# Patient Record
Sex: MALE | Race: WHITE | NOT HISPANIC OR LATINO | URBAN - METROPOLITAN AREA
[De-identification: names, ages, dates, MRNs, and addresses within clinical notes are randomized per-mention and may not be internally consistent; named-entity substitution may affect disease eponyms.]

---

## 2018-06-25 ENCOUNTER — EMERGENCY (EMERGENCY)
Facility: HOSPITAL | Age: 43
LOS: 1 days | End: 2018-06-25
Payer: COMMERCIAL

## 2018-06-25 PROCEDURE — 70450 CT HEAD/BRAIN W/O DYE: CPT | Mod: 26

## 2018-06-25 PROCEDURE — 72125 CT NECK SPINE W/O DYE: CPT | Mod: 26

## 2018-06-25 PROCEDURE — 99284 EMERGENCY DEPT VISIT MOD MDM: CPT

## 2019-10-01 ENCOUNTER — APPOINTMENT (OUTPATIENT)
Dept: FAMILY MEDICINE | Facility: CLINIC | Age: 44
End: 2019-10-01
Payer: COMMERCIAL

## 2019-10-01 VITALS
HEART RATE: 72 BPM | OXYGEN SATURATION: 98 % | DIASTOLIC BLOOD PRESSURE: 80 MMHG | WEIGHT: 195 LBS | BODY MASS INDEX: 25.03 KG/M2 | RESPIRATION RATE: 16 BRPM | SYSTOLIC BLOOD PRESSURE: 130 MMHG | TEMPERATURE: 97.8 F | HEIGHT: 74 IN

## 2019-10-01 DIAGNOSIS — Z82.49 FAMILY HISTORY OF ISCHEMIC HEART DISEASE AND OTHER DISEASES OF THE CIRCULATORY SYSTEM: ICD-10-CM

## 2019-10-01 DIAGNOSIS — Z78.9 OTHER SPECIFIED HEALTH STATUS: ICD-10-CM

## 2019-10-01 LAB
BILIRUB UR QL STRIP: NORMAL
GLUCOSE UR-MCNC: NORMAL
HCG UR QL: 1 EU/DL
HGB UR QL STRIP.AUTO: NORMAL
KETONES UR-MCNC: NORMAL
LEUKOCYTE ESTERASE UR QL STRIP: NORMAL
NITRITE UR QL STRIP: NORMAL
PH UR STRIP: 7.5
PROT UR STRIP-MCNC: NORMAL
SP GR UR STRIP: 1.01

## 2019-10-01 PROCEDURE — 90471 IMMUNIZATION ADMIN: CPT

## 2019-10-01 PROCEDURE — 36415 COLL VENOUS BLD VENIPUNCTURE: CPT

## 2019-10-01 PROCEDURE — 99396 PREV VISIT EST AGE 40-64: CPT | Mod: 25

## 2019-10-01 PROCEDURE — 81003 URINALYSIS AUTO W/O SCOPE: CPT | Mod: QW

## 2019-10-01 PROCEDURE — 90686 IIV4 VACC NO PRSV 0.5 ML IM: CPT

## 2019-10-01 NOTE — PHYSICAL EXAM
[No Acute Distress] : no acute distress [Well-Appearing] : well-appearing [Normal Sclera/Conjunctiva] : normal sclera/conjunctiva [PERRL] : pupils equal round and reactive to light [Normal Oropharynx] : the oropharynx was normal [Normal TMs] : both tympanic membranes were normal [No Lymphadenopathy] : no lymphadenopathy [No Respiratory Distress] : no respiratory distress  [Thyroid Normal, No Nodules] : the thyroid was normal and there were no nodules present [Clear to Auscultation] : lungs were clear to auscultation bilaterally [Normal S1, S2] : normal S1 and S2 [Regular Rhythm] : with a regular rhythm [No Edema] : there was no peripheral edema [Soft] : abdomen soft [No Extremity Clubbing/Cyanosis] : no extremity clubbing/cyanosis [Non Tender] : non-tender [Normal Posterior Cervical Nodes] : no posterior cervical lymphadenopathy [No HSM] : no HSM [Normal Anterior Cervical Nodes] : no anterior cervical lymphadenopathy [No Spinal Tenderness] : no spinal tenderness [Kyphosis] : no kyphosis [No Joint Swelling] : no joint swelling [No Focal Deficits] : no focal deficits [No Rash] : no rash [Normal Gait] : normal gait [Deep Tendon Reflexes (DTR)] : deep tendon reflexes were 2+ and symmetric [Alert and Oriented x3] : oriented to person, place, and time [Normal Insight/Judgement] : insight and judgment were intact [de-identified] : warm and dry  [de-identified] : calm and engaging  [de-identified] : no tremors   PAIN to palpation medial epicondyle RIGHT arm

## 2019-10-01 NOTE — ASSESSMENT
[FreeTextEntry1] :  Well exam for 43 year old WM  with PMH as stated in HPI / active list. \par \par Management : \par \par Tendinitis  Advised ice 15 minutes on, and how often before and after use.  Also, advised a trial of ibuprofen, 600 mg 3 times daily with food or OTC Pepcid  for up to 7 days.\par Offered consult for steroid injection if fails\par \par See HPI and Plan\par \par Labs in office today.   Will advise. \par \par Best wishes offered !\par

## 2019-10-01 NOTE — HISTORY OF PRESENT ILLNESS
[FreeTextEntry1] : Annual CPE \par [facility]  [de-identified] : Mr. SKYLAR STEWART presents today for CPE; known to me from [facility].\par He is an affable 43 year old male with PMH significant for   NONE \par PSH significant for Shoulder and knee arthroscopy.\par Denies any recent ER visits/hospitalizations/ MVA's or MSK injuries. \par \par ONLY complaint today is RIGHT ELBOW TENDINITIS for a few months.\par Is initiating PT today. \par Never evaluated .  NO NSAID.  "Just deal with it". \par Has tried CBD oil for various aches and pain and  "I think I at least sleep better". 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

## 2019-10-07 LAB
ALBUMIN SERPL ELPH-MCNC: 5.1 G/DL
ALP BLD-CCNC: 72 U/L
ALT SERPL-CCNC: 42 U/L
ANION GAP SERPL CALC-SCNC: 14 MMOL/L
AST SERPL-CCNC: 28 U/L
BASOPHILS # BLD AUTO: 0.07 K/UL
BASOPHILS NFR BLD AUTO: 1.1 %
BILIRUB SERPL-MCNC: 0.9 MG/DL
BUN SERPL-MCNC: 13 MG/DL
CALCIUM SERPL-MCNC: 10.4 MG/DL
CHLORIDE SERPL-SCNC: 101 MMOL/L
CHOLEST SERPL-MCNC: 185 MG/DL
CHOLEST/HDLC SERPL: 2 RATIO
CO2 SERPL-SCNC: 27 MMOL/L
CREAT SERPL-MCNC: 1.15 MG/DL
EOSINOPHIL # BLD AUTO: 0.1 K/UL
EOSINOPHIL NFR BLD AUTO: 1.6 %
GLUCOSE SERPL-MCNC: 97 MG/DL
HCT VFR BLD CALC: 51.4 %
HDLC SERPL-MCNC: 93 MG/DL
HGB BLD-MCNC: 17.3 G/DL
IMM GRANULOCYTES NFR BLD AUTO: 0.3 %
LDLC SERPL CALC-MCNC: 81 MG/DL
LYMPHOCYTES # BLD AUTO: 1.79 K/UL
LYMPHOCYTES NFR BLD AUTO: 28.5 %
MAN DIFF?: NORMAL
MCHC RBC-ENTMCNC: 30.6 PG
MCHC RBC-ENTMCNC: 33.7 GM/DL
MCV RBC AUTO: 91 FL
MONOCYTES # BLD AUTO: 0.58 K/UL
MONOCYTES NFR BLD AUTO: 9.2 %
NEUTROPHILS # BLD AUTO: 3.72 K/UL
NEUTROPHILS NFR BLD AUTO: 59.3 %
PLATELET # BLD AUTO: 310 K/UL
POTASSIUM SERPL-SCNC: 5.1 MMOL/L
PROT SERPL-MCNC: 7.8 G/DL
RBC # BLD: 5.65 M/UL
RBC # FLD: 11.7 %
SODIUM SERPL-SCNC: 142 MMOL/L
TRIGL SERPL-MCNC: 56 MG/DL
TSH SERPL-ACNC: 1.16 UIU/ML
WBC # FLD AUTO: 6.28 K/UL

## 2020-10-05 ENCOUNTER — LABORATORY RESULT (OUTPATIENT)
Age: 45
End: 2020-10-05

## 2020-10-06 ENCOUNTER — APPOINTMENT (OUTPATIENT)
Dept: FAMILY MEDICINE | Facility: CLINIC | Age: 45
End: 2020-10-06
Payer: COMMERCIAL

## 2020-10-06 VITALS
HEIGHT: 74 IN | SYSTOLIC BLOOD PRESSURE: 126 MMHG | RESPIRATION RATE: 16 BRPM | OXYGEN SATURATION: 98 % | TEMPERATURE: 98.4 F | DIASTOLIC BLOOD PRESSURE: 80 MMHG | BODY MASS INDEX: 24.9 KG/M2 | HEART RATE: 74 BPM | WEIGHT: 194 LBS

## 2020-10-06 DIAGNOSIS — M77.8 OTHER ENTHESOPATHIES, NOT ELSEWHERE CLASSIFIED: ICD-10-CM

## 2020-10-06 PROCEDURE — 81003 URINALYSIS AUTO W/O SCOPE: CPT | Mod: QW

## 2020-10-06 PROCEDURE — 99396 PREV VISIT EST AGE 40-64: CPT | Mod: 25

## 2020-10-09 PROBLEM — M77.8 TENDINITIS OF ELBOW: Status: RESOLVED | Noted: 2019-10-01 | Resolved: 2020-10-09

## 2020-10-09 NOTE — PHYSICAL EXAM
[No Acute Distress] : no acute distress [Well-Appearing] : well-appearing [Normal Sclera/Conjunctiva] : normal sclera/conjunctiva [PERRL] : pupils equal round and reactive to light [Normal Oropharynx] : the oropharynx was normal [Normal TMs] : both tympanic membranes were normal [No Lymphadenopathy] : no lymphadenopathy [Thyroid Normal, No Nodules] : the thyroid was normal and there were no nodules present [No Respiratory Distress] : no respiratory distress  [Clear to Auscultation] : lungs were clear to auscultation bilaterally [Regular Rhythm] : with a regular rhythm [Normal S1, S2] : normal S1 and S2 [No Edema] : there was no peripheral edema [No Extremity Clubbing/Cyanosis] : no extremity clubbing/cyanosis [Soft] : abdomen soft [Non Tender] : non-tender [No HSM] : no HSM [Normal Posterior Cervical Nodes] : no posterior cervical lymphadenopathy [Normal Anterior Cervical Nodes] : no anterior cervical lymphadenopathy [No Spinal Tenderness] : no spinal tenderness [No Joint Swelling] : no joint swelling [No Rash] : no rash [No Focal Deficits] : no focal deficits [Normal Gait] : normal gait [Deep Tendon Reflexes (DTR)] : deep tendon reflexes were 2+ and symmetric [Alert and Oriented x3] : oriented to person, place, and time [Normal Insight/Judgement] : insight and judgment were intact [Kyphosis] : no kyphosis [de-identified] : calm and engaging  [de-identified] : no tremors   PAIN to palpation medial epicondyle RIGHT arm  [de-identified] : warm and dry

## 2020-10-09 NOTE — ASSESSMENT
[FreeTextEntry1] : \par \par  Well exam for 44 year old WM with PMH as stated in HPI / active list. \par \par Management : \par \par \par See HPI and Plan\par \par Labs in office today.   Will advise. \par \par Best wishes offered !\par \par

## 2020-10-09 NOTE — HEALTH RISK ASSESSMENT
[Very Good] : ~his/her~  mood as very good [Yes] : Yes [Monthly or less (1 pt)] : Monthly or less (1 point) [1 or 2 (0 pts)] : 1 or 2 (0 points) [Never (0 pts)] : Never (0 points) [No] : In the past 12 months have you used drugs other than those required for medical reasons? No [0] : 2) Feeling down, depressed, or hopeless: Not at all (0) [FreeTextEntry1] : CPE [] : No [Audit-CScore] : 1 [FYN5Ldyps] : 0

## 2020-10-09 NOTE — HISTORY OF PRESENT ILLNESS
[FreeTextEntry1] : Annual CPE \par Last seen to establish care in October 2019 and encounter reviewed. \par Elbow tendinitis did resolve. \par In last year he has been well and \par Denies any recent ER visits/hospitalizations/ MVA's or MSK injuries.\par Continues regular exercise and healthy eating .  Weight is stable . 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  [de-identified] : In review: 10/19\par Mr. SKYLAR STEWART presents today for CPE; known to me from [facility].\par He is an affable 43 year old male with PMH significant for   NONE \par PSH significant for Shoulder and knee arthroscopy.\par Denies any recent ER visits/hospitalizations/ MVA's or MSK injuries. \par \par ONLY complaint today is RIGHT ELBOW TENDINITIS for a few months.\par Is initiating PT today. \par Never evaluated .  NO NSAID.  "Just deal with it". \par Has tried CBD oil for various aches and pain and  "I think I at least sleep better". 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

## 2020-10-10 LAB
BILIRUB UR QL STRIP: NEGATIVE
CLARITY UR: CLEAR
COLLECTION METHOD: NORMAL
GLUCOSE UR-MCNC: NEGATIVE
HCG UR QL: 0.2 EU/DL
HGB UR QL STRIP.AUTO: NEGATIVE
KETONES UR-MCNC: NEGATIVE
LEUKOCYTE ESTERASE UR QL STRIP: NORMAL
NITRITE UR QL STRIP: NEGATIVE
PH UR STRIP: 6
PROT UR STRIP-MCNC: NEGATIVE
SP GR UR STRIP: 1.02

## 2020-10-20 ENCOUNTER — APPOINTMENT (OUTPATIENT)
Dept: FAMILY MEDICINE | Facility: CLINIC | Age: 45
End: 2020-10-20
Payer: COMMERCIAL

## 2020-10-20 DIAGNOSIS — Z23 ENCOUNTER FOR IMMUNIZATION: ICD-10-CM

## 2020-10-20 PROCEDURE — 99072 ADDL SUPL MATRL&STAF TM PHE: CPT

## 2020-10-20 PROCEDURE — 99214 OFFICE O/P EST MOD 30 MIN: CPT | Mod: 25

## 2020-10-20 RX ORDER — IBUPROFEN 600 MG/1
600 TABLET, FILM COATED ORAL 3 TIMES DAILY
Qty: 1 | Refills: 1 | Status: DISCONTINUED | COMMUNITY
Start: 2019-10-01 | End: 2020-10-20

## 2020-10-21 PROBLEM — Z23 INFLUENZA VACCINATION GIVEN: Status: RESOLVED | Noted: 2019-10-01 | Resolved: 2020-10-21

## 2020-10-21 NOTE — REVIEW OF SYSTEMS
[Fatigue] : fatigue [Recent Change In Weight] : ~T recent weight change [Negative] : Integumentary [Headache] : no headache [Confusion] : no confusion [Memory Loss] : no memory loss [FreeTextEntry2] : a few lbs

## 2020-10-21 NOTE — PHYSICAL EXAM
[No Acute Distress] : no acute distress [Normal Sclera/Conjunctiva] : normal sclera/conjunctiva [Normal Oropharynx] : the oropharynx was normal [Supple] : supple [No Respiratory Distress] : no respiratory distress  [No Accessory Muscle Use] : no accessory muscle use [Regular Rhythm] : with a regular rhythm [Normal S1, S2] : normal S1 and S2 [Normal Gait] : normal gait [Alert and Oriented x3] : oriented to person, place, and time [de-identified] : a bit heightened   articulate and good eye contact  engaging

## 2020-10-21 NOTE — ASSESSMENT
[FreeTextEntry1] : Much support rendered and agree with the plan.\par \par FOr anxiety related insomnia will offer a trail of  Remeron.

## 2020-10-21 NOTE — HISTORY OF PRESENT ILLNESS
[FreeTextEntry1] : FU on ACUTE ANXIETY . [de-identified] : Shanna HARDY  presents tonight to discuss his acute anxiety, which seems to have worsened in recent week.\par at our encounter 2 weeks ago.  He did refer to some transient anxiety after watching a season on TV  "[place]".\par since however, he has developed more persistent symptoms.  Yesterday he took a day off from work and researched his symptoms and learned ( INTERNET SEARCH; MULTIPLE sites) he has somatic symptoms of acute anxiety. He describes chest tightness, sweaty feet, a sense of dyspnea but most significantly is interrupted sleep; "less than 3 hours" .  "ALSO no appetite". He is startled at this onset ;\par Fortunately he was able to discuss via [employer] , with mental health  ( available through his employer, [employer])  and is now anticipating regular visits in an attempt to "get this understood and resolved".

## 2020-11-06 ENCOUNTER — APPOINTMENT (OUTPATIENT)
Dept: UROLOGY | Facility: CLINIC | Age: 45
End: 2020-11-06
Payer: COMMERCIAL

## 2020-11-06 VITALS
SYSTOLIC BLOOD PRESSURE: 127 MMHG | WEIGHT: 194 LBS | TEMPERATURE: 97.9 F | HEIGHT: 74 IN | HEART RATE: 88 BPM | DIASTOLIC BLOOD PRESSURE: 89 MMHG | BODY MASS INDEX: 24.9 KG/M2

## 2020-11-06 PROCEDURE — 99204 OFFICE O/P NEW MOD 45 MIN: CPT

## 2020-11-06 PROCEDURE — 99072 ADDL SUPL MATRL&STAF TM PHE: CPT

## 2020-11-06 NOTE — HISTORY OF PRESENT ILLNESS
[FreeTextEntry1] : CC: \par \par HPI: \par \par Duration: \par Location: \par Severity:\par Stability:\par Alleviating factors:\par Exacerbating factors: \par Associated symptoms: \par Quality:\par \par FAMHX: \par SURGHX: \par SOCIAL:\par ROS:\par

## 2020-11-06 NOTE — PHYSICAL EXAM
[General Appearance - Well Developed] : well developed [General Appearance - Well Nourished] : well nourished [Normal Appearance] : normal appearance [Well Groomed] : well groomed [General Appearance - In No Acute Distress] : no acute distress [Edema] : no peripheral edema [Respiration, Rhythm And Depth] : normal respiratory rhythm and effort [Exaggerated Use Of Accessory Muscles For Inspiration] : no accessory muscle use [Abdomen Soft] : soft [Abdomen Tenderness] : non-tender [Costovertebral Angle Tenderness] : no ~M costovertebral angle tenderness [Penis Abnormality] : normal circumcised penis [Urinary Bladder Findings] : the bladder was normal on palpation [Scrotum] : the scrotum was normal [Rectal Exam - Seminal Vesicles] : the seminal vesicles were normal [Epididymis] : the epididymides were normal [Testes Tenderness] : no tenderness of the testes [FreeTextEntry1] : bilat vas deferens palpated [Normal Station and Gait] : the gait and station were normal for the patient's age [] : no rash [No Focal Deficits] : no focal deficits [Oriented To Time, Place, And Person] : oriented to person, place, and time [Affect] : the affect was normal [Mood] : the mood was normal [Not Anxious] : not anxious

## 2020-11-06 NOTE — ASSESSMENT
[FreeTextEntry1] : Plan:\par schedule vasectomy\par After discussions of the various options of birth control available to the patient, he has elected to undergo vasectomy.\par \par I have discussed the risks, benefits, and alternatives with him including, but not limited to, bleeding, infection, recanalization and subsequent pregnancy, testicular pain, hematoma. We discussed the use of local anesthesia for the procedure as well as the need for post vasectomy semen analyses. He also understand that vasectomy will not protect him against sexually transmitted diseases. He agrees to proceed.\par \par

## 2020-11-06 NOTE — HISTORY OF PRESENT ILLNESS
[FreeTextEntry1] : Mr. SKYLAR STEWART 44 year old  M  PMH anxiety and PSH left knee, left bicep, left shoulder, Uvula extraction. Pt comes in to discuss vasectomy.  has 3 kids and does not want anymore kids. \par

## 2020-11-17 ENCOUNTER — APPOINTMENT (OUTPATIENT)
Dept: UROLOGY | Facility: CLINIC | Age: 45
End: 2020-11-17
Payer: COMMERCIAL

## 2020-11-17 VITALS
SYSTOLIC BLOOD PRESSURE: 128 MMHG | HEIGHT: 74 IN | HEART RATE: 102 BPM | DIASTOLIC BLOOD PRESSURE: 82 MMHG | TEMPERATURE: 97 F

## 2020-11-17 PROCEDURE — 55250 REMOVAL OF SPERM DUCT(S): CPT

## 2020-11-24 ENCOUNTER — APPOINTMENT (OUTPATIENT)
Dept: UROLOGY | Facility: CLINIC | Age: 45
End: 2020-11-24
Payer: COMMERCIAL

## 2020-11-24 VITALS
HEART RATE: 65 BPM | BODY MASS INDEX: 24.9 KG/M2 | TEMPERATURE: 97.5 F | WEIGHT: 194 LBS | SYSTOLIC BLOOD PRESSURE: 145 MMHG | HEIGHT: 74 IN | DIASTOLIC BLOOD PRESSURE: 82 MMHG

## 2020-11-24 LAB — CORE LAB BIOPSY: NORMAL

## 2020-11-24 PROCEDURE — 99024 POSTOP FOLLOW-UP VISIT: CPT

## 2020-11-24 NOTE — PHYSICAL EXAM
[General Appearance - Well Developed] : well developed [General Appearance - Well Nourished] : well nourished [Normal Appearance] : normal appearance [Well Groomed] : well groomed [General Appearance - In No Acute Distress] : no acute distress [Abdomen Soft] : soft [Abdomen Tenderness] : non-tender [Costovertebral Angle Tenderness] : no ~M costovertebral angle tenderness [Urethral Meatus] : meatus normal [Penis Abnormality] : normal circumcised penis [Urinary Bladder Findings] : the bladder was normal on palpation [FreeTextEntry1] : left incision site small amount of pus and a small approx 1 cm area of his incision not totally healed, right site healing well. [Edema] : no peripheral edema [] : no respiratory distress [Respiration, Rhythm And Depth] : normal respiratory rhythm and effort [Exaggerated Use Of Accessory Muscles For Inspiration] : no accessory muscle use [Oriented To Time, Place, And Person] : oriented to person, place, and time [Affect] : the affect was normal [Mood] : the mood was normal [Not Anxious] : not anxious [Normal Station and Gait] : the gait and station were normal for the patient's age [No Focal Deficits] : no focal deficits

## 2020-11-24 NOTE — HISTORY OF PRESENT ILLNESS
[FreeTextEntry1] : Pt comes in follow vasectomy. Pt states the right is healing better then the left. On the left he states he notices a small amount of occasional pus and a small approx 1 cm area of his incision not totally healed

## 2020-12-01 ENCOUNTER — APPOINTMENT (OUTPATIENT)
Dept: UROLOGY | Facility: CLINIC | Age: 45
End: 2020-12-01
Payer: COMMERCIAL

## 2020-12-01 VITALS
DIASTOLIC BLOOD PRESSURE: 82 MMHG | HEART RATE: 80 BPM | HEIGHT: 74 IN | TEMPERATURE: 97.4 F | BODY MASS INDEX: 24.9 KG/M2 | SYSTOLIC BLOOD PRESSURE: 147 MMHG | WEIGHT: 194 LBS

## 2020-12-01 PROCEDURE — 99024 POSTOP FOLLOW-UP VISIT: CPT

## 2020-12-01 NOTE — HISTORY OF PRESENT ILLNESS
[FreeTextEntry1] : Incision site healing well.  incision is closing well.Improved since last visit. Not totally closed yet. No signs of infection.

## 2020-12-17 ENCOUNTER — RX RENEWAL (OUTPATIENT)
Age: 45
End: 2020-12-17

## 2021-01-05 ENCOUNTER — APPOINTMENT (OUTPATIENT)
Dept: UROLOGY | Facility: CLINIC | Age: 46
End: 2021-01-05
Payer: COMMERCIAL

## 2021-01-05 VITALS
BODY MASS INDEX: 24.9 KG/M2 | TEMPERATURE: 97.3 F | SYSTOLIC BLOOD PRESSURE: 131 MMHG | DIASTOLIC BLOOD PRESSURE: 75 MMHG | HEIGHT: 74 IN | HEART RATE: 73 BPM | WEIGHT: 194 LBS

## 2021-01-05 PROCEDURE — 99024 POSTOP FOLLOW-UP VISIT: CPT

## 2021-01-05 NOTE — HISTORY OF PRESENT ILLNESS
[FreeTextEntry1] : Pt comes in follow up vasectomy. Incision site healing well. Pt offers no complaints.

## 2021-01-05 NOTE — PHYSICAL EXAM
[General Appearance - Well Developed] : well developed [General Appearance - Well Nourished] : well nourished [Normal Appearance] : normal appearance [Well Groomed] : well groomed [General Appearance - In No Acute Distress] : no acute distress [Abdomen Soft] : soft [Abdomen Tenderness] : non-tender [Costovertebral Angle Tenderness] : no ~M costovertebral angle tenderness [Urinary Bladder Findings] : the bladder was normal on palpation [FreeTextEntry1] : Vasectomy site healed.  [Edema] : no peripheral edema [] : no respiratory distress [Respiration, Rhythm And Depth] : normal respiratory rhythm and effort [Exaggerated Use Of Accessory Muscles For Inspiration] : no accessory muscle use [Oriented To Time, Place, And Person] : oriented to person, place, and time [Affect] : the affect was normal [Mood] : the mood was normal [Not Anxious] : not anxious [Normal Station and Gait] : the gait and station were normal for the patient's age [No Focal Deficits] : no focal deficits

## 2021-04-20 ENCOUNTER — APPOINTMENT (OUTPATIENT)
Dept: FAMILY MEDICINE | Facility: CLINIC | Age: 46
End: 2021-04-20
Payer: COMMERCIAL

## 2021-04-20 VITALS
HEART RATE: 92 BPM | HEIGHT: 74 IN | WEIGHT: 200 LBS | DIASTOLIC BLOOD PRESSURE: 92 MMHG | RESPIRATION RATE: 16 BRPM | TEMPERATURE: 97.5 F | OXYGEN SATURATION: 98 % | SYSTOLIC BLOOD PRESSURE: 140 MMHG | BODY MASS INDEX: 25.67 KG/M2

## 2021-04-20 DIAGNOSIS — Z98.52 VASECTOMY STATUS: ICD-10-CM

## 2021-04-20 DIAGNOSIS — Z30.09 ENCOUNTER FOR OTHER GENERAL COUNSELING AND ADVICE ON CONTRACEPTION: ICD-10-CM

## 2021-04-20 PROCEDURE — 99214 OFFICE O/P EST MOD 30 MIN: CPT

## 2021-04-20 PROCEDURE — 99072 ADDL SUPL MATRL&STAF TM PHE: CPT

## 2021-04-20 RX ORDER — CEPHALEXIN 500 MG/1
500 CAPSULE ORAL TWICE DAILY
Qty: 14 | Refills: 0 | Status: DISCONTINUED | COMMUNITY
Start: 2020-11-24 | End: 2021-04-20

## 2021-04-21 ENCOUNTER — TRANSCRIPTION ENCOUNTER (OUTPATIENT)
Age: 46
End: 2021-04-21

## 2021-04-25 PROBLEM — Z30.09 FAMILY PLANNING COUNSELING: Status: RESOLVED | Noted: 2020-11-06 | Resolved: 2021-04-25

## 2021-04-25 PROBLEM — Z98.52 HISTORY OF VASECTOMY: Status: RESOLVED | Noted: 2020-11-17 | Resolved: 2021-04-25

## 2021-04-25 NOTE — PHYSICAL EXAM
[Normal Sclera/Conjunctiva] : normal sclera/conjunctiva [Normal Oropharynx] : the oropharynx was normal [Supple] : supple [No Respiratory Distress] : no respiratory distress  [No Accessory Muscle Use] : no accessory muscle use [Regular Rhythm] : with a regular rhythm [Normal S1, S2] : normal S1 and S2 [Normal Gait] : normal gait [Alert and Oriented x3] : oriented to person, place, and time [de-identified] : calm and engaging

## 2021-04-25 NOTE — HISTORY OF PRESENT ILLNESS
[FreeTextEntry1] : would like to discuss going back on a medication for his anxiety and possibly get something to help to sleep \[facility] \par [facility] [de-identified] : Rick presents today to discuss his anxiety:\par he offers that he continues to do much of the hard work with regard to understanding  His anxiety late onset in his life,and therapies to ameliorate it.  He has had some success with therapy, meditation , vigorous regular exercise with some improvement  "for sure".\par Most troubling, is impaired sleep/interrupted sleep.  He also opines that he is  "languishing" a term he identifies with when researching this topic. Rick offers that it means feelings related to depression and anxiety   Further endorses  Further endorses it's a feeling of flatness, lack of Jot in life.\par He further states that he has everything to be happy about, good marriage good job good kids and Good health.\par Has discovered he may have been suppressing some unpleasant events related to growing up  (family)  and relationships in the past.\par \par Rick "just want to be "best version of myself" ;  not sleeping may have sometime impact overall as well.\par His research suggests  he introduce medication but  "Not forever "  but a trial.\par \par In review: Oct 20 2020\par Shanna HARDY  presents tonight to discuss his acute anxiety, which seems to have worsened in recent week.\par at our encounter 2 weeks ago.  He did refer to some transient anxiety after watching a season on TV  "[employer]".\par since however, he has developed more persistent symptoms.  Yesterday he took a day off from work and researched his symptoms and learned ( INTERNET SEARCH; MULTIPLE sites) he has somatic symptoms of acute anxiety. He describes chest tightness, sweaty feet, a sense of dyspnea but most significantly is interrupted sleep; "less than 3 hours" .  "ALSO no appetite". He is startled at this onset ;\par Fortunately he was able to discuss via [facility] , with mental health  ( available through his employer, [employer])  and is now anticipating regular visits in an attempt to "get this understood and resolved".

## 2021-04-25 NOTE — ASSESSMENT
[FreeTextEntry1] : Insomnia and anxiety perhaps component of depression.\par \par MUCH time was afforded in this encounter discussing some of the biochemistry of depression and sleep disorders.\par \par He agrees to give mirtazapine and a trial at night for at least one solid week.\par He also would give low dose Prozac a trial.\par \par we agree he will call me in a week or so to discuss.\par \par advised to continue his regular exercise, nutrition and mediation. \par \par Best wishes! \par \par Time spent: 40 minutes.

## 2021-05-13 ENCOUNTER — RX RENEWAL (OUTPATIENT)
Age: 46
End: 2021-05-13

## 2021-12-13 ENCOUNTER — TRANSCRIPTION ENCOUNTER (OUTPATIENT)
Age: 46
End: 2021-12-13

## 2022-04-05 ENCOUNTER — APPOINTMENT (OUTPATIENT)
Dept: FAMILY MEDICINE | Facility: CLINIC | Age: 47
End: 2022-04-05
Payer: COMMERCIAL

## 2022-04-05 VITALS
OXYGEN SATURATION: 97 % | RESPIRATION RATE: 16 BRPM | BODY MASS INDEX: 25.54 KG/M2 | SYSTOLIC BLOOD PRESSURE: 136 MMHG | HEART RATE: 79 BPM | WEIGHT: 199 LBS | HEIGHT: 74 IN | TEMPERATURE: 98 F | DIASTOLIC BLOOD PRESSURE: 84 MMHG

## 2022-04-05 DIAGNOSIS — F41.9 ANXIETY DISORDER, UNSPECIFIED: ICD-10-CM

## 2022-04-05 PROCEDURE — 36415 COLL VENOUS BLD VENIPUNCTURE: CPT

## 2022-04-05 PROCEDURE — 99396 PREV VISIT EST AGE 40-64: CPT | Mod: 25

## 2022-04-05 PROCEDURE — G0442 ANNUAL ALCOHOL SCREEN 15 MIN: CPT | Mod: NC

## 2022-04-05 RX ORDER — FLUOXETINE HYDROCHLORIDE 20 MG/1
20 CAPSULE ORAL
Qty: 30 | Refills: 0 | Status: DISCONTINUED | COMMUNITY
Start: 2021-04-20 | End: 2022-04-05

## 2022-04-12 PROBLEM — F41.9 ACUTE ANXIETY: Status: RESOLVED | Noted: 2020-10-20 | Resolved: 2022-04-12

## 2022-04-12 NOTE — PHYSICAL EXAM
[Well-Appearing] : well-appearing [Normal Sclera/Conjunctiva] : normal sclera/conjunctiva [PERRL] : pupils equal round and reactive to light [Normal Oropharynx] : the oropharynx was normal [Normal TMs] : both tympanic membranes were normal [No Lymphadenopathy] : no lymphadenopathy [Thyroid Normal, No Nodules] : the thyroid was normal and there were no nodules present [No Respiratory Distress] : no respiratory distress  [Clear to Auscultation] : lungs were clear to auscultation bilaterally [Regular Rhythm] : with a regular rhythm [Normal S1, S2] : normal S1 and S2 [No Edema] : there was no peripheral edema [No Extremity Clubbing/Cyanosis] : no extremity clubbing/cyanosis [Soft] : abdomen soft [Non Tender] : non-tender [No HSM] : no HSM [Normal Posterior Cervical Nodes] : no posterior cervical lymphadenopathy [Normal Anterior Cervical Nodes] : no anterior cervical lymphadenopathy [No Spinal Tenderness] : no spinal tenderness [No Joint Swelling] : no joint swelling [No Rash] : no rash [No Focal Deficits] : no focal deficits [Normal Gait] : normal gait [Deep Tendon Reflexes (DTR)] : deep tendon reflexes were 2+ and symmetric [Alert and Oriented x3] : oriented to person, place, and time [Normal Insight/Judgement] : insight and judgment were intact [Kyphosis] : no kyphosis [de-identified] : calm and engaging  [de-identified] : no tremors   PAIN to palpation medial epicondyle RIGHT arm  [de-identified] : warm and dry

## 2022-04-12 NOTE — HEALTH RISK ASSESSMENT
[Never] : Never [Yes] : Yes [1 or 2 (0 pts)] : 1 or 2 (0 points) [Never (0 pts)] : Never (0 points) [No] : In the past 12 months have you used drugs other than those required for medical reasons? No [No falls in past year] : Patient reported no falls in the past year [0] : 2) Feeling down, depressed, or hopeless: Not at all (0) [Very Good] : ~his/her~  mood as very good [Monthly or less (1 pt)] : Monthly or less (1 point) [FreeTextEntry1] : CPE [Audit-CScore] : 1 [[id number]] : 0

## 2022-04-12 NOTE — HISTORY OF PRESENT ILLNESS
[FreeTextEntry1] : CPE  Last seen for same in October 2020.\par Acute visit October 20 2020  for acute anxiety as well as s in April 2021 and encounters reviewed as well. \par Sleep Do associated is much improved with PRN Mirtazapine.\par Continues strategies of mediation, mindfulness, regular aerobic exercise ( Basketball) and weights  and endorses he is managing well.\par \par All family member s had COVID in December ; he himself had moderately severe  H/A, fever ( 102 103), anorexia ( lost 10 lbs)  for several days; full recovery; concerns heightened for son Leighton Age 12, who has asthma and continues with  intermittent  sxs. Follows with Pulmonologist. \par \par Recent "bucket list " trip to [facility] to see favorite  band 3-11.   Enjoyed it! [de-identified] : In review: 10/ 2020 \par In last year he has been well and \par Denies any recent ER visits/hospitalizations/ MVA's or MSK injuries.\par Continues regular exercise and healthy eating .  Weight is stable . 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 \par \par \par \par In review: 2019 \par Mr. SKYLAR STEWART presents today for CPE; known to me from [facility].\par He is an affable 43 year old male with PMH significant for   NONE \par PSH significant for Shoulder and knee arthroscopy.\par Denies any recent ER visits/hospitalizations/ MVA's or MSK injuries. \par \par ONLY complaint today is RIGHT ELBOW TENDINITIS for a few months.\par Is initiating PT today. \par Never evaluated .  NO NSAID.  "Just deal with it". \par Has tried CBD oil for various aches and pain and  "I think I at least sleep better". 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

## 2022-04-12 NOTE — ASSESSMENT
[FreeTextEntry1] : Annual exam for 46  year old    with PMH as stated in HPI / active list. \par \par Management : \par \par \par See HPI and Plan\par \par Labs in office today.   Will advise. \par \par Best wishes offered !\par \par \par

## 2022-04-13 LAB
ALBUMIN SERPL ELPH-MCNC: 4.8 G/DL
ALP BLD-CCNC: 73 U/L
ALT SERPL-CCNC: 31 U/L
ANION GAP SERPL CALC-SCNC: 16 MMOL/L
APPEARANCE: CLEAR
AST SERPL-CCNC: 28 U/L
BASOPHILS # BLD AUTO: 0.06 K/UL
BASOPHILS NFR BLD AUTO: 0.8 %
BILIRUB SERPL-MCNC: 0.6 MG/DL
BILIRUBIN URINE: NEGATIVE
BLOOD URINE: NEGATIVE
BUN SERPL-MCNC: 14 MG/DL
CALCIUM SERPL-MCNC: 10.1 MG/DL
CHLORIDE SERPL-SCNC: 104 MMOL/L
CHOLEST SERPL-MCNC: 197 MG/DL
CO2 SERPL-SCNC: 20 MMOL/L
COLOR: COLORLESS
CREAT SERPL-MCNC: 1.09 MG/DL
EGFR: 85 ML/MIN/1.73M2
EOSINOPHIL # BLD AUTO: 0.08 K/UL
EOSINOPHIL NFR BLD AUTO: 1.1 %
ESTIMATED AVERAGE GLUCOSE: 105 MG/DL
GLUCOSE QUALITATIVE U: NEGATIVE
GLUCOSE SERPL-MCNC: 86 MG/DL
HBA1C MFR BLD HPLC: 5.3 %
HCT VFR BLD CALC: 47.1 %
HDLC SERPL-MCNC: 75 MG/DL
HGB BLD-MCNC: 16.5 G/DL
IMM GRANULOCYTES NFR BLD AUTO: 0.3 %
KETONES URINE: NEGATIVE
LDLC SERPL CALC-MCNC: 107 MG/DL
LEUKOCYTE ESTERASE URINE: NEGATIVE
LYMPHOCYTES # BLD AUTO: 2.37 K/UL
LYMPHOCYTES NFR BLD AUTO: 31.8 %
MAN DIFF?: NORMAL
MCHC RBC-ENTMCNC: 31.4 PG
MCHC RBC-ENTMCNC: 35 GM/DL
MCV RBC AUTO: 89.7 FL
MONOCYTES # BLD AUTO: 0.64 K/UL
MONOCYTES NFR BLD AUTO: 8.6 %
NEUTROPHILS # BLD AUTO: 4.28 K/UL
NEUTROPHILS NFR BLD AUTO: 57.4 %
NITRITE URINE: NEGATIVE
NONHDLC SERPL-MCNC: 121 MG/DL
PH URINE: 5
PLATELET # BLD AUTO: 315 K/UL
POTASSIUM SERPL-SCNC: 4.4 MMOL/L
PROT SERPL-MCNC: 7.9 G/DL
PROTEIN URINE: NEGATIVE
RBC # BLD: 5.25 M/UL
RBC # FLD: 11.9 %
SODIUM SERPL-SCNC: 140 MMOL/L
SPECIFIC GRAVITY URINE: 1.01
TRIGL SERPL-MCNC: 72 MG/DL
TSH SERPL-ACNC: 1.01 UIU/ML
UROBILINOGEN URINE: NORMAL
WBC # FLD AUTO: 7.45 K/UL

## 2022-06-07 ENCOUNTER — TRANSCRIPTION ENCOUNTER (OUTPATIENT)
Age: 47
End: 2022-06-07

## 2022-08-27 ENCOUNTER — TRANSCRIPTION ENCOUNTER (OUTPATIENT)
Age: 47
End: 2022-08-27

## 2022-12-08 ENCOUNTER — APPOINTMENT (OUTPATIENT)
Dept: ORTHOPEDIC SURGERY | Facility: CLINIC | Age: 47
End: 2022-12-08

## 2022-12-08 VITALS — BODY MASS INDEX: 25.54 KG/M2 | HEIGHT: 74 IN | WEIGHT: 199 LBS

## 2022-12-08 PROCEDURE — 73564 X-RAY EXAM KNEE 4 OR MORE: CPT | Mod: LT

## 2022-12-08 PROCEDURE — 99203 OFFICE O/P NEW LOW 30 MIN: CPT

## 2022-12-09 NOTE — IMAGING
[Left] : left knee [All Views] : anteroposterior, lateral, skyline, and anteroposterior standing [de-identified] : The patient is a well appearing 46 year  old male of their stated age. \par Patient ambulates with a normal gait. \par Negative straight leg raise bilateral \par \par Effected Knee: LEFT\par ROM:  0-145 degrees \par Lachman: Negative \par Pivot Shift: Negative \par Anterior Drawer: Negative \par Posterior Drawer / Sag:Negative \par Varus Stress 0 degrees: Stable \par Varus Stress 30 degrees: Stable \par Valgus Stress 0 degrees: Stable \par Valgus Stress 30 degrees: Stable \par Medial Mervat: Negative \par Lateral Mervat: Negative \par Patella Glide: 2+ \par Patella Apprehension: Negative \par Patella Grind: Negative \par \par Palpation: \par Medial Joint Line: TENDER, MILDLY\par Lateral Joint Line: Nontender \par Medial Collateral Ligament: Nontender \par Lateral Collateral Ligament: Nontender \par Posterior Collateral Ligament: TENDER\par Distal Femur: Nontender \par Proximal Tibia: Nontender \par Tibial Tubercle: Nontender \par Distal Pole Patella: Nontender \par Quadriceps Tendon: Nontender &  Intact \par Patella Tendon: Nontender &  Intact \par Medial Femoral Condyle: Nontender \par Medial Distal Hamstring/PES: Nontender \par Lateral Distal Hamstring: Nontender & Stable \par Iliotibial Band: Nontender \par Medial Patellofemoral Ligament: Nontender \par Adductor: Nontender \par Proximal GSC-Plantaris: Nontender \par Calf: Supple & Nontender \par \par Inspection: \par Deformity: No \par Erythema: No \par Ecchymosis: No \par Abrasions: No \par Effusion: No \par Prepatella Bursitis: No \par Neurologic Exam: \par Sensation L4-S1: Grossly Intact \par Motor Exam: \par Quadriceps: 5 out of 5 \par Hamstrings: 5 out of 5 \par EHL: 5 out of 5 \par FHL: 5 out of 5 \par TA: 5 out of 5 \par GS: 5 out of 5 \par Circulatory/Pulses: \par Dorsalis Pedis: 2+ \par Posterior Tibialis: 2+ \par Additional Pertinent Findings: None \par \par Contralateral Knee:                           	 \par ROM: 0-145 degrees \par Other Pertinent Findings: None \par \par Assessment: The patient is a 46 year old male with left knee pain and radiographic and physical exam findings consistent with PCL insufficiency vs secondary medial hamstring strain. \par The patient’s condition is acute \par Documents/Results Reviewed Today: X Ray left knee \par Tests/Studies Independently Interpreted Today: X Ray left knee reveals previous evidence of PCL reconstruction with tibial tunnel exostosis, femoral diaphysis, fibula diaphysis \par Pertinent findings include:  mild MJLT and tender PCL\par Confounding medical conditions/concerns: None\par \par Plan: Patient will continue with stretching and exercise program focusing on quad strength. Continue use of Derotation brace during activities. Take OTC antiinflammatories as needed - use as directed. Modify activity as discussed.\par  \par Tests Ordered: None \par Prescription Medications Ordered: None\par Braces/DME Ordered: None \par Activity/Work/Sports Status: P.E teacher \par Additional Instructions: None\par Follow-Up: PRN \par \par The patient's current medication management of their orthopedic diagnosis was reviewed today:\par (1) We discussed a comprehensive treatment plan that included possible pharmaceutical management involving the use of prescription strength medications including but not limited to options such as oral Naprosyn 500mg BID, once daily Meloxicam 15 mg, or 500-650 mg Tylenol versus over the counter oral medications and topical prescription NSAID Pennsaid vs over the counter Voltaren gel.\par (2) There is a moderate risk of morbidity with further treatment, especially from use of prescription strength medications and possible side effects of these medications which include upset stomach with oral medications, skin reactions to topical medications and cardiac/renal issues with long term use.\par (3) I recommended that the patient follow-up with their medical physician to discuss any significant specific potential issues with long term medication use such as interactions with current medications or with exacerbation of underlying medical comorbidities.\par (4) The benefits and risks associated with use of injectable, oral or topical, prescription and over the counter anti-inflammatory medications were discussed with the patient. The patient voiced understanding of the risks including but not limited to bleeding, stroke, kidney dysfunction, heart disease, and were referred to the black box warning label for further information.\par  \par \par Marquita RUANO attest that this documentation has been prepared under the direction and in the presence of Provider Dr. Jorge Ulloa. \par \par \par The documentation recorded by the scribe accurately reflects the service I personally performed and the decisions made by me.\par The patient was seen by me under the direct supervision of Dr. Jorge Ulloa\par  [FreeTextEntry9] :  X Ray left knee reveals previous evidence of PCL reconstruction with tibial tunnel exostosis, femoral diaphysis, fibula diaphysis

## 2022-12-09 NOTE — HISTORY OF PRESENT ILLNESS
[de-identified] : The patient is a 46 year old right hand dominant male who presents today complaining of left knee pain.  \par Date of Injury/Onset: August 2022 \par Pain:    At Rest: 0/10 \par With Activity:  /10 \par Mechanism of injury: PCL surgery 1996\par This is not a Work Related Injury being treated under Worker's Compensation.\par This is not an athletic injury occurring associated with an interscholastic or organized sports team.\par Quality of symptoms: dull ache, migration pain medially to the back\par Improves with: rest\par Worse with: activity \par Prior treatment: None recently \par Prior Imaging: None recently \par Out of work/sport: No, since [currently working] \par School/Sport/Position/Occupation: physical education teache\par Additional Information: None\par

## 2023-01-04 ENCOUNTER — FORM ENCOUNTER (OUTPATIENT)
Age: 48
End: 2023-01-04

## 2023-01-05 ENCOUNTER — APPOINTMENT (OUTPATIENT)
Dept: MRI IMAGING | Facility: CLINIC | Age: 48
End: 2023-01-05
Payer: COMMERCIAL

## 2023-01-05 PROCEDURE — 73721 MRI JNT OF LWR EXTRE W/O DYE: CPT | Mod: LT

## 2023-01-19 ENCOUNTER — APPOINTMENT (OUTPATIENT)
Dept: ORTHOPEDIC SURGERY | Facility: CLINIC | Age: 48
End: 2023-01-19
Payer: COMMERCIAL

## 2023-01-19 VITALS — BODY MASS INDEX: 25.54 KG/M2 | WEIGHT: 199 LBS | HEIGHT: 74 IN

## 2023-01-19 PROCEDURE — J3490M: CUSTOM

## 2023-01-19 PROCEDURE — 99214 OFFICE O/P EST MOD 30 MIN: CPT | Mod: 25

## 2023-01-19 PROCEDURE — 20610 DRAIN/INJ JOINT/BURSA W/O US: CPT | Mod: LT

## 2023-01-20 NOTE — HISTORY OF PRESENT ILLNESS
[de-identified] : The patient is a 46 year old right hand dominant male who presents today complaining of left knee pain.  \par Date of Injury/Onset: August 2022 \par Pain:    At Rest: 2/10 \par With Activity:  8/10 \par Mechanism of injury: PCL surgery 1996\par This is not a Work Related Injury being treated under Worker's Compensation.\par This is not an athletic injury occurring associated with an interscholastic or organized sports team.\par Quality of symptoms: dull ache, medial joint line to posterior knee, sharp\par Improves with: rest\par Worse with: squatting, kneeling, walking, stairs\par Treatment/Imaging/Studies Since Last Visit: MRI \par 	Reports Available For Review Today: MRI report\par Out of work/sport: No, since [currently working] \par School/Sport/Position/Occupation: \par changes since last visit: patient was playing basketball 1/3/23 when he started experiencing worsening pain\par Additional Information: None\par

## 2023-01-20 NOTE — IMAGING
[de-identified] : The patient is a well appearing 46 year  old male of their stated age. \par Patient ambulates with a normal gait. \par Negative straight leg raise bilateral \par \par Effected Knee: LEFT\par ROM:  0-145 degrees \par Lachman: Negative \par Pivot Shift: Negative \par Anterior Drawer: Negative \par Posterior Drawer / Sag:Negative \par Varus Stress 0 degrees: Stable \par Varus Stress 30 degrees: Stable \par Valgus Stress 0 degrees: Stable \par Valgus Stress 30 degrees: Stable \par Medial Mervat: POSITIVE \par Lateral Mervat: Negative \par Patella Glide: 2+ \par Patella Apprehension: Negative \par Patella Grind: Negative \par \par Palpation: \par Medial Joint Line: TENDER\par Lateral Joint Line: Nontender \par Medial Collateral Ligament: Nontender \par Lateral Collateral Ligament: Nontender \par Posterior Collateral Ligament: Nontender \par Distal Femur: Nontender \par Proximal Tibia: Nontender \par Tibial Tubercle: Nontender \par Distal Pole Patella: Nontender \par Quadriceps Tendon: Nontender &  Intact \par Patella Tendon: Nontender &  Intact \par Medial Femoral Condyle: Nontender \par Medial Distal Hamstring/PES: Nontender \par Lateral Distal Hamstring: Nontender & Stable \par Iliotibial Band: Nontender \par Medial Patellofemoral Ligament: Nontender \par Adductor: Nontender \par Proximal GSC-Plantaris: Nontender \par Calf: Supple & Nontender \par \par Inspection: \par Deformity: No \par Erythema: No \par Ecchymosis: No \par Abrasions: No \par Effusion: No \par Prepatella Bursitis: No \par Neurologic Exam: \par Sensation L4-S1: Grossly Intact \par Motor Exam: \par Quadriceps: 5 out of 5 \par Hamstrings: 5 out of 5 \par EHL: 5 out of 5 \par FHL: 5 out of 5 \par TA: 5 out of 5 \par GS: 5 out of 5 \par Circulatory/Pulses: \par Dorsalis Pedis: 2+ \par Posterior Tibialis: 2+ \par Additional Pertinent Findings: None \par \par Contralateral Knee:                           	 \par ROM: 0-145 degrees \par Other Pertinent Findings: None \par \par Assessment: The patient is a 46 year old male with left knee pain and radiographic and physical exam findings consistent with PCL insufficiency and medial OA\par The patient’s condition is acute \par Documents/Results Reviewed Today: MRI left knee \par Tests/Studies Independently Interpreted Today: MRI left knee reveals evidence of previous PCL repair and medial compartment OA with joint line narrowing, degeneration of posterior aspect of medial meniscus \par Pertinent findings include: MJLT, +Medial Mervat\par Confounding medical conditions/concerns: None\par \par Plan: Discussed conservative treatment in the form of injections. Patient elected to receive 9/1 left knee today. Advised patient to continue with stretching and exercise program. Continue use of Derotation brace during activities. Prescribed patient Naproxen for pain management and inflammation - take as directed. Modify activity as discussed.\par  \par Tests Ordered: None \par Prescription Medications Ordered: Naproxen\par Braces/DME Ordered: None \par Activity/Work/Sports Status: P.E teacher \par Additional Instructions: None\par Follow-Up: 6 weeks \par \par Procedure Note: Musculoskeletal Injection \par Diagnosis: Left knee medial compartment OA\par Procedure: Left knee, superolateral, 9/1 CSI\par \par Indication:  The patient has had persistent pain despite conservative treatment.  Risks, benefits and alternatives to procedure were discussed; all questions were answered to the patient's apparent satisfaction and informed consent obtained.  The patient denied prior problems with local anesthetics, injectable cortisones, chicken allergy, coagulopathy and no relevant drug or preservative allergies or sensitivities.\par \par The area of injection was prepared in a sterile fashion.  Prior to injection a 'Time Out' was conducted in accordance with [facility] policy and the site and nature of procedure verified with the patient. \par \par Procedure: \par The procedure was carried out utilizing sterile technique from a [superolateral] arthroscopic portal position. \par [The needle was placed under ultrasound guidance to improve accuracy and minimize risk to the patient and diagnostic ultrasound in the long and short axis revealed [pathology associated with the above diagnosis]] CSI DOES NOT USE US**\par \par 0cc of clear synovial fluid was aspirated. \par The specimen: \par (X) appeared benign and was discarded \par ( ) was sent for Culture / Cell Count / Crystal analysis / [_].] \par \par Injection into the target area with care taken to aspirate frequently to minimize the risk of intravascular injection was performed with: \par ( ) 1cc of Depomedrol (80mg/ml) \par (X) 1cc of Dexamethasone (10mg/ml) \par ( ) 1cc of Toradol (30mg/ml) \par (X) 9cc of 0.5% Bupivacaine \par ( ) 1cc of 1% Lidocaine \par ( ) 5cc of 32mg Zilretta, prepared and diluted per  instructions \par ( ) 2 cc of Hylan G-F 20 (Synvisc) 16mg/2ml \par ( ) 6 cc of Hylan G-F 20 (SynvisoOne) 16mg/2ml \par ( ) 2cc of Euflexxa \par ( ) 2cc of Orthovisc \par ( ) 2cc of GelOne \par ( ) 3cc of Durolane (20mg/ml) \par \par Patient tolerated the procedure well and direct pressure was applied for hemostasis. The patient was reminded of potential post-injection risks including, but not limited to, delayed hypersensitivity reactions and/or infection.  The patient verified that they had the office and the Emergency Room's contact information if any problems should arise.  After several minutes, the patient informed me that they felt fine and was released from the office. \par  \par \par The patient's current medication management of their orthopedic diagnosis was reviewed today:\par (1) We discussed a comprehensive treatment plan that included possible pharmaceutical management involving the use of prescription strength medications including but not limited to options such as oral Naprosyn 500mg BID, once daily Meloxicam 15 mg, or 500-650 mg Tylenol versus over the counter oral medications and topical prescription NSAID Pennsaid vs over the counter Voltaren gel.\par (2) There is a moderate risk of morbidity with further treatment, especially from use of prescription strength medications and possible side effects of these medications which include upset stomach with oral medications, skin reactions to topical medications and cardiac/renal issues with long term use.\par (3) I recommended that the patient follow-up with their medical physician to discuss any significant specific potential issues with long term medication use such as interactions with current medications or with exacerbation of underlying medical comorbidities.\par (4) The benefits and risks associated with use of injectable, oral or topical, prescription and over the counter anti-inflammatory medications were discussed with the patient. The patient voiced understanding of the risks including but not limited to bleeding, stroke, kidney dysfunction, heart disease, and were referred to the black box warning label for further information.\par \par Marquita RUANO attest that this documentation has been prepared under the direction and in the presence of Provider Dr. Jorge Ulloa. \par \par The documentation recorded by the scribe accurately reflects the service I personally performed and the decisions made by me.\par The patient was seen by me under the direct supervision of Dr. Jorge Ulloa.\par

## 2023-01-20 NOTE — DATA REVIEWED
[MRI] : MRI [Left] : left [Knee] : knee [Report was reviewed and noted in the chart] : The report was reviewed and noted in the chart [I independently reviewed and interpreted images and report] : I independently reviewed and interpreted images and report [I reviewed the films/CD and additionally noted] : I reviewed the films/CD and additionally noted [FreeTextEntry1] : MRI left knee reveals evidence of previous PCL repair and medial compartment OA with joint line narrowing, degeneration of posterior aspect of medial meniscus

## 2023-01-31 ENCOUNTER — TRANSCRIPTION ENCOUNTER (OUTPATIENT)
Age: 48
End: 2023-01-31

## 2023-02-16 ENCOUNTER — NON-APPOINTMENT (OUTPATIENT)
Age: 48
End: 2023-02-16

## 2023-02-16 ENCOUNTER — APPOINTMENT (OUTPATIENT)
Dept: ORTHOPEDIC SURGERY | Facility: CLINIC | Age: 48
End: 2023-02-16
Payer: COMMERCIAL

## 2023-02-16 VITALS — BODY MASS INDEX: 25.54 KG/M2 | WEIGHT: 199 LBS | HEIGHT: 74 IN

## 2023-02-16 PROCEDURE — 99213 OFFICE O/P EST LOW 20 MIN: CPT

## 2023-02-17 NOTE — IMAGING
[de-identified] : The patient is a well appearing 46 year  old male of their stated age. \par Patient ambulates with a normal gait. \par Negative straight leg raise bilateral \par \par Effected Knee: LEFT\par ROM:  0-145 degrees \par Lachman: Negative \par Pivot Shift: Negative \par Anterior Drawer: Negative \par Posterior Drawer / Sag:Negative \par Varus Stress 0 degrees: Stable \par Varus Stress 30 degrees: Stable \par Valgus Stress 0 degrees: Stable \par Valgus Stress 30 degrees: Stable \par Medial Mervat: POSITIVE \par Lateral Mervat: Negative \par Patella Glide: 2+ \par Patella Apprehension: Negative \par Patella Grind: Negative \par \par Palpation: \par Medial Joint Line: TENDER\par Lateral Joint Line: Nontender \par Medial Collateral Ligament: Nontender \par Lateral Collateral Ligament: Nontender \par Posterior Collateral Ligament: Nontender \par Distal Femur: Nontender \par Proximal Tibia: Nontender \par Tibial Tubercle: Nontender \par Distal Pole Patella: Nontender \par Quadriceps Tendon: Nontender &  Intact \par Patella Tendon: Nontender &  Intact \par Medial Femoral Condyle: Nontender \par Medial Distal Hamstring/PES: Nontender \par Lateral Distal Hamstring: Nontender & Stable \par Iliotibial Band: Nontender \par Medial Patellofemoral Ligament: Nontender \par Adductor: Nontender \par Proximal GSC-Plantaris: Nontender \par Calf: Supple & Nontender \par \par Inspection: \par Deformity: No \par Erythema: No \par Ecchymosis: No \par Abrasions: No \par Effusion: No \par Prepatella Bursitis: No \par Neurologic Exam: \par Sensation L4-S1: Grossly Intact \par Motor Exam: \par Quadriceps: 5 out of 5 \par Hamstrings: 5 out of 5 \par EHL: 5 out of 5 \par FHL: 5 out of 5 \par TA: 5 out of 5 \par GS: 5 out of 5 \par Circulatory/Pulses: \par Dorsalis Pedis: 2+ \par Posterior Tibialis: 2+ \par Additional Pertinent Findings: None \par \par Contralateral Knee:                           	 \par ROM: 0-145 degrees \par Other Pertinent Findings: None \par \par Assessment: The patient is a 46 year old male with left knee pain and radiographic and physical exam findings consistent with PCL insufficiency and medial OA\par The patient’s condition is acute \par Documents/Results Reviewed Today: None \par Tests/Studies Independently Interpreted Today: None \par Pertinent findings include: MJLT, +Medial Mervat\par Confounding medical conditions/concerns: Previous PCL repair \par \par Plan: Patient reports minimal improvement from previous CSI. Discussed further conservative treatment options in the form of Gel-one injections. Discussed with the patient that he is not an ideal candidate for meniscectomy due to the state of his OA. Advised patient to continue with stretching and exercise program. Patient will continue use of Derotation brace during activities. Prescribed patient Naproxen for pain management an inflammation - use as directed. Referred patient to Dr. Frances to further discuss unicompartmental knee replacement. Modify activity as discussed.\par  \par Tests Ordered: None \par Prescription Medications Ordered: Naproxen \par Braces/DME Ordered: None \par Activity/Work/Sports Status: P.E teacher \par Additional Instructions: Referral to Dr. Gastelum\par Follow-Up: PRN \par \par The patient's current medication management of their orthopedic diagnosis was reviewed today:\par (1) We discussed a comprehensive treatment plan that included possible pharmaceutical management involving the use of prescription strength medications including but not limited to options such as oral Naprosyn 500mg BID, once daily Meloxicam 15 mg, or 500-650 mg Tylenol versus over the counter oral medications and topical prescription NSAID Pennsaid vs over the counter Voltaren gel.  Based on our extensive discussion, the patient was prescribed Naprosyn 500mg BID for two weeks.  It will then be used PRN for pain, inflammation and discomfort.\par (2) There is a moderate risk of morbidity with further treatment, especially from use of prescription strength medications and possible side effects of these medications which include upset stomach with oral medications, skin reactions to topical medications and cardiac/renal issues with long term use.\par (3) I recommended that the patient follow-up with their medical physician to discuss any significant specific potential issues with long term medication use such as interactions with current medications or with exacerbation of underlying medical comorbidities.\par (4) The benefits and risks associated with use of injectable, oral or topical, prescription and over the counter anti-inflammatory medications were discussed with the patient. The patient voiced understanding of the risks including but not limited to bleeding, stroke, kidney dysfunction, heart disease, and were referred to the black box warning label for further information. \par \par Marquita RUANO attest that this documentation has been prepared under the direction and in the presence of Provider Dr. Jorge Ulloa. \par \par The documentation recorded by the scribe accurately reflects the services IDr. Jorge, personally performed and the decisions made by me.\par

## 2023-02-17 NOTE — HISTORY OF PRESENT ILLNESS
[de-identified] : The patient is a 46 year old right hand dominant male who presents today complaining of left knee pain.  \par Date of Injury/Onset: August 2022 \par Pain:    At Rest: 5/10 \par With Activity:  9/10 \par Mechanism of injury: PCL surgery 1996\par This is not a Work Related Injury being treated under Worker's Compensation.\par This is not an athletic injury occurring associated with an interscholastic or organized sports team.\par Quality of symptoms: dull ache, medial joint line to posterior knee, sharp\par Improves with: rest\par Worse with: squatting, kneeling, walking, stairs, sleeping\par Treatment/Imaging/Studies Since Last Visit: PT for 8 sessions\par 	Reports Available For Review Today: none\par Out of work/sport: No, since [currently working] \par School/Sport/Position/Occupation: \par changes since last visit: Patient has had no improvement in pain with PT or CSI (12/8/22).\par Additional Information: None\par

## 2023-03-23 ENCOUNTER — APPOINTMENT (OUTPATIENT)
Dept: ORTHOPEDIC SURGERY | Facility: CLINIC | Age: 48
End: 2023-03-23
Payer: COMMERCIAL

## 2023-03-23 VITALS — BODY MASS INDEX: 26.95 KG/M2 | WEIGHT: 210 LBS | HEIGHT: 74 IN

## 2023-03-23 PROCEDURE — 99214 OFFICE O/P EST MOD 30 MIN: CPT

## 2023-03-23 NOTE — PHYSICAL EXAM
[5___] : hamstring 5[unfilled]/5 [Left] : left knee [AP] : anteroposterior [Lateral] : lateral [[place]] : skyline [AP Standing] : anteroposterior standing [Outside films reviewed] : Outside films reviewed [] : no crepitus [FreeTextEntry3] : swollen  [FreeTextEntry9] : [facility] primary medical care 3/7/23: tunnel from PCL, medial joint space narrowing with sclerosis\par  [TWNoteComboBox7] : flexion 130 degrees [de-identified] : extension 0 degrees

## 2023-03-23 NOTE — REASON FOR VISIT
[FreeTextEntry2] : HERE TODAY FOR EVAL LEFT KNEE PAIN.  REFERRED BY DR WHITE.  PAIN 8/10 WITH WALKING, SWELLING, INSTABILTY IN LAST YEAR.  BRACE, PT, HARRY INJECTION AND GEL INJECTION - LAST 3/21/23, STRETCHING PROGRAM, YOGA. \par

## 2023-03-23 NOTE — HISTORY OF PRESENT ILLNESS
[de-identified] : 12/08/2022   The patient is a 46 year old right hand dominant male who presents today complaining of left knee pain.  \par Date of Injury/Onset: August 2022 \par Pain:    At Rest: 5/10 \par With Activity:  9/10 \par Mechanism of injury: PCL surgery 1996\par This is not a Work Related Injury being treated under Worker's Compensation.\par This is not an athletic injury occurring associated with an interscholastic or organized sports team.\par Quality of symptoms: dull ache, medial joint line to posterior knee, sharp\par Improves with: rest\par Worse with: squatting, kneeling, walking, stairs, sleeping\par Treatment/Imaging/Studies Since Last Visit: PT for 8 sessions\par 	Reports Available For Review Today: none\par Out of work/sport: No, since [currently working] \par School/Sport/Position/Occupation: \par changes since last visit: Patient has had no improvement in pain with PT or CSI (12/8/22).\par Additional Information: None\par

## 2023-03-23 NOTE — DISCUSSION/SUMMARY
[Surgical risks reviewed] : Surgical risks reviewed [de-identified] : Reviewed MRI images and report of the left knee with the pt \par \par Lengthy discussion regarding options was had with the patient. Nonsurgical options including but not limited to cortisone,viscosupplementation, anti-inflammatory medications, activity modification,  non impact exercise /maintaining a healthy BMI, bracing, and icing were reviewed. Surgical options including but not limited to arthroscopy, and joint replacement were discussed as was risks, benefits and alternatives. All questions were answered. \par \par Recommend left MUKA \par \par I spent time going in detail the problem and the associated risks/benefits of surgery. detailed complications but not limited to: of nerve injury, non union, repeat fx, dvt/pe postop, instability,transfusion, infection, NVA injury, stiffness, leg length discrepancy, inability to ambulate & death. discussed implant and model shown to patient. answered all patient questions. patient understands procedure and postop directions. Patient has failed conservative treatment and  PT is contraindicated at this time \par \par Recommended stretches for hamstring, knee, and calf to release tension

## 2023-03-23 NOTE — DATA REVIEWED
[MRI] : MRI [Left] : left [Knee] : knee [Report was reviewed and noted in the chart] : The report was reviewed and noted in the chart [FreeTextEntry1] : 1. Status post posterior cruciate ligament repair with possible recurrent sprain but no recurrent discontinuity.\par 2. Findings representing either tearing involving the posterior horn of the medial meniscus or partial medial \par meniscectomy which should be correlated clinically.\par 3. Medial compartment arthrosis with chondral loss, joint space narrowing, slight subchondral edema, and mild \par osteophytes.\par 4. Mild ACL sprain, mild MCL sprain, extensor mechanism tendinopathy, prepatellar soft tissue swelling, mildto-moderate effusion, capsulitis, popliteal cyst which may have ruptured and mild pes bursitis with mild anterior \par medial subcutaneous edema.\par 5. No evidence of acute osseous injury or lateral meniscal tear.\par 6. Clinical correlation regarding prior surgery is needed.

## 2023-04-07 ENCOUNTER — NON-APPOINTMENT (OUTPATIENT)
Age: 48
End: 2023-04-07

## 2023-04-07 ENCOUNTER — APPOINTMENT (OUTPATIENT)
Dept: FAMILY MEDICINE | Facility: CLINIC | Age: 48
End: 2023-04-07
Payer: COMMERCIAL

## 2023-04-07 VITALS
BODY MASS INDEX: 26.95 KG/M2 | DIASTOLIC BLOOD PRESSURE: 88 MMHG | OXYGEN SATURATION: 96 % | SYSTOLIC BLOOD PRESSURE: 128 MMHG | RESPIRATION RATE: 16 BRPM | HEIGHT: 74 IN | WEIGHT: 210 LBS | HEART RATE: 81 BPM | TEMPERATURE: 97.4 F

## 2023-04-07 DIAGNOSIS — R68.82 DECREASED LIBIDO: ICD-10-CM

## 2023-04-07 DIAGNOSIS — Z98.52 VASECTOMY STATUS: ICD-10-CM

## 2023-04-07 DIAGNOSIS — M25.562 PAIN IN LEFT KNEE: ICD-10-CM

## 2023-04-07 PROCEDURE — 36415 COLL VENOUS BLD VENIPUNCTURE: CPT

## 2023-04-07 PROCEDURE — 99215 OFFICE O/P EST HI 40 MIN: CPT | Mod: 25

## 2023-04-07 PROCEDURE — 93000 ELECTROCARDIOGRAM COMPLETE: CPT

## 2023-04-10 ENCOUNTER — FORM ENCOUNTER (OUTPATIENT)
Age: 48
End: 2023-04-10

## 2023-04-12 ENCOUNTER — FORM ENCOUNTER (OUTPATIENT)
Age: 48
End: 2023-04-12

## 2023-04-12 PROBLEM — Z98.52 STATUS POST VASECTOMY: Status: RESOLVED | Noted: 2020-11-24 | Resolved: 2021-04-25

## 2023-04-12 PROBLEM — R68.82 LOW LIBIDO: Status: ACTIVE | Noted: 2023-04-07

## 2023-04-12 NOTE — PHYSICAL EXAM
[No Acute Distress] : no acute distress [Normal Sclera/Conjunctiva] : normal sclera/conjunctiva [No JVD] : no jugular venous distention [No Respiratory Distress] : no respiratory distress  [Clear to Auscultation] : lungs were clear to auscultation bilaterally [Regular Rhythm] : with a regular rhythm [Normal S1, S2] : normal S1 and S2 [No Edema] : there was no peripheral edema [Soft] : abdomen soft [Non Tender] : non-tender [Non-distended] : non-distended [No Spinal Tenderness] : no spinal tenderness [No Joint Swelling] : no joint swelling [No Rash] : no rash [No Focal Deficits] : no focal deficits [Speech Grossly Normal] : speech grossly normal [Alert and Oriented x3] : oriented to person, place, and time [de-identified] : calm and engaging  [de-identified] : LUISITO [de-identified] : FROM B/L knee  [de-identified] : EKG  NSR

## 2023-04-12 NOTE — RESULTS/DATA
[] : results reviewed [de-identified] : unremarkable  [de-identified] : unremarkable  [de-identified] : unremarkable [de-identified] : NSR [de-identified] : A 1 c 5.4

## 2023-04-12 NOTE — PHYSICAL EXAM
[No Acute Distress] : no acute distress [Normal Sclera/Conjunctiva] : normal sclera/conjunctiva [No JVD] : no jugular venous distention [No Respiratory Distress] : no respiratory distress  [Clear to Auscultation] : lungs were clear to auscultation bilaterally [Regular Rhythm] : with a regular rhythm [Normal S1, S2] : normal S1 and S2 [No Edema] : there was no peripheral edema [Soft] : abdomen soft [Non Tender] : non-tender [Non-distended] : non-distended [No Spinal Tenderness] : no spinal tenderness [No Joint Swelling] : no joint swelling [No Rash] : no rash [No Focal Deficits] : no focal deficits [Speech Grossly Normal] : speech grossly normal [Alert and Oriented x3] : oriented to person, place, and time [de-identified] : calm and engaging  [de-identified] : LUISITO [de-identified] : FROM B/L knee  [de-identified] : EKG  NSR

## 2023-04-12 NOTE — HISTORY OF PRESENT ILLNESS
[No Pertinent Cardiac History] : no history of aortic stenosis, atrial fibrillation, coronary artery disease, recent myocardial infarction, or implantable device/pacemaker [No Pertinent Pulmonary History] : no history of asthma, COPD, sleep apnea, or smoking [(Patient denies any chest pain, claudication, dyspnea on exertion, orthopnea, palpitations or syncope)] : Patient denies any chest pain, claudication, dyspnea on exertion, orthopnea, palpitations or syncope [Good (7-10 METs)] : Good (7-10 METs) [Chronic Anticoagulation] : no chronic anticoagulation [Chronic Kidney Disease] : no chronic kidney disease [Diabetes] : no diabetes [FreeTextEntry1] : Partial Knee Replacement      [FreeTextEntry2] : 04/20/2023 [FreeTextEntry3] : Dr Noyola  [FreeTextEntry4] : [facility] \par f- [phone]  \par \par LEFT Knee pain and instability for several month  for Active MALE ; Failed BRACE/T/cortisone and gel injections.\par MRI confirms tear of medial meniscus and OA \par \par NO PMH . Feels well today . \par

## 2023-04-12 NOTE — ASSESSMENT
[FreeTextEntry4] :  At this time, I see no absolute  contraindication for proposed procedure.  COVID test results as per surgery. \par Reviewed, and advised no aspirin, NSAIDs, fish oil vitamin E. one week prior to procedure.\par If you take BP medication, take the AM of surgery with small sip of water. \par Advised to ensure normal BM day prior to surgery.\par Strategies to achieve reviewed. \par \par Best wishes offered.\par \par

## 2023-04-12 NOTE — RESULTS/DATA
[] : results reviewed [de-identified] : unremarkable  [de-identified] : unremarkable [de-identified] : unremarkable  [de-identified] : NSR [de-identified] : A 1 c 5.4

## 2023-04-12 NOTE — HISTORY OF PRESENT ILLNESS
[No Pertinent Cardiac History] : no history of aortic stenosis, atrial fibrillation, coronary artery disease, recent myocardial infarction, or implantable device/pacemaker [No Pertinent Pulmonary History] : no history of asthma, COPD, sleep apnea, or smoking [(Patient denies any chest pain, claudication, dyspnea on exertion, orthopnea, palpitations or syncope)] : Patient denies any chest pain, claudication, dyspnea on exertion, orthopnea, palpitations or syncope [Good (7-10 METs)] : Good (7-10 METs) [Chronic Anticoagulation] : no chronic anticoagulation [Chronic Kidney Disease] : no chronic kidney disease [Diabetes] : no diabetes [FreeTextEntry1] : Partial Knee Replacement      [FreeTextEntry2] : 04/20/2023 [FreeTextEntry3] : Dr Noyola  [FreeTextEntry4] : [facility] \par f- [phone]  \par \par LEFT Knee pain and instability for several month  for Active MALE ; Failed BRACE/T/cortisone and gel injections.\par MRI confirms tear of medial meniscus and OA \par \par NO PMH . Feels well today . \par

## 2023-04-17 LAB
ALBUMIN SERPL ELPH-MCNC: 4.8 G/DL
ALP BLD-CCNC: 88 U/L
ALT SERPL-CCNC: 39 U/L
ANION GAP SERPL CALC-SCNC: 19 MMOL/L
APPEARANCE: CLEAR
APTT 2H P 1:4 NP PPP: 34.7 SEC
APTT 2H P INC PPP: 32.9 SEC
APTT IMM NP/PRE NP PPP: 33.3 SEC
APTT INV RATIO PPP: 36.7 SEC
AST SERPL-CCNC: 25 U/L
BASOPHILS # BLD AUTO: 0.07 K/UL
BASOPHILS NFR BLD AUTO: 0.8 %
BILIRUB SERPL-MCNC: 0.8 MG/DL
BILIRUBIN URINE: NEGATIVE
BLOOD URINE: NEGATIVE
BUN SERPL-MCNC: 17 MG/DL
CALCIUM SERPL-MCNC: 10 MG/DL
CHLORIDE SERPL-SCNC: 99 MMOL/L
CO2 SERPL-SCNC: 20 MMOL/L
COLOR: NORMAL
CREAT SERPL-MCNC: 1.17 MG/DL
EGFR: 77 ML/MIN/1.73M2
EOSINOPHIL # BLD AUTO: 0.19 K/UL
EOSINOPHIL NFR BLD AUTO: 2.1 %
ESTIMATED AVERAGE GLUCOSE: 108 MG/DL
GLUCOSE QUALITATIVE U: NEGATIVE
GLUCOSE SERPL-MCNC: 67 MG/DL
HBA1C MFR BLD HPLC: 5.4 %
HCT VFR BLD CALC: 50.8 %
HGB BLD-MCNC: 17.3 G/DL
IMM GRANULOCYTES NFR BLD AUTO: 0.6 %
INR PPP: 0.97 RATIO
KETONES URINE: ABNORMAL
LEUKOCYTE ESTERASE URINE: NEGATIVE
LYMPHOCYTES # BLD AUTO: 2.11 K/UL
LYMPHOCYTES NFR BLD AUTO: 22.8 %
MAN DIFF?: NORMAL
MCHC RBC-ENTMCNC: 31.2 PG
MCHC RBC-ENTMCNC: 34.1 GM/DL
MCV RBC AUTO: 91.7 FL
MONOCYTES # BLD AUTO: 0.64 K/UL
MONOCYTES NFR BLD AUTO: 6.9 %
NEUTROPHILS # BLD AUTO: 6.18 K/UL
NEUTROPHILS NFR BLD AUTO: 66.8 %
NITRITE URINE: NEGATIVE
NPP NORMAL POOLED PLASMA: 33.3 SECS
PH URINE: 6
PLATELET # BLD AUTO: 297 K/UL
POTASSIUM SERPL-SCNC: 3.9 MMOL/L
PROT SERPL-MCNC: 7.5 G/DL
PROTEIN URINE: NEGATIVE
PT BLD: 11.3 SEC
RBC # BLD: 5.54 M/UL
RBC # FLD: 12.2 %
SODIUM SERPL-SCNC: 138 MMOL/L
SPECIFIC GRAVITY URINE: 1.01
TESTOST FREE SERPL-MCNC: 7.3 PG/ML
TESTOST SERPL-MCNC: 283 NG/DL
TESTOST SERPL-MCNC: 283 NG/DL
UROBILINOGEN URINE: NORMAL
WBC # FLD AUTO: 9.25 K/UL

## 2023-04-20 ENCOUNTER — APPOINTMENT (OUTPATIENT)
Dept: ORTHOPEDIC SURGERY | Facility: AMBULATORY SURGERY CENTER | Age: 48
End: 2023-04-20
Payer: COMMERCIAL

## 2023-04-20 PROCEDURE — 20985 CPTR-ASST DIR MS PX: CPT

## 2023-04-20 PROCEDURE — 27446 REVISION OF KNEE JOINT: CPT | Mod: LT

## 2023-04-20 PROCEDURE — 27446 REVISION OF KNEE JOINT: CPT | Mod: AS,LT

## 2023-04-20 PROCEDURE — 20610 DRAIN/INJ JOINT/BURSA W/O US: CPT | Mod: 59,LT

## 2023-04-24 ENCOUNTER — NON-APPOINTMENT (OUTPATIENT)
Age: 48
End: 2023-04-24

## 2023-04-28 ENCOUNTER — APPOINTMENT (OUTPATIENT)
Dept: ORTHOPEDIC SURGERY | Facility: CLINIC | Age: 48
End: 2023-04-28
Payer: COMMERCIAL

## 2023-04-28 PROCEDURE — 99024 POSTOP FOLLOW-UP VISIT: CPT

## 2023-04-28 NOTE — DISCUSSION/SUMMARY
[de-identified] : All of the patients questions were answered \par Recommend pt wait one month to go in a pool\par Recommend stockings for left knee for blood clot prevention \par Discussed importance of non-impact exercise and muscle stretching before and after exercise. \par pt is going to outpatient PT\par f/u 4 weeks

## 2023-04-28 NOTE — PHYSICAL EXAM
[Left] : left knee [5___] : hamstring 5[unfilled]/5 [] : no sign of infection [TWNoteComboBox7] : flexion 125 degrees [de-identified] : extension 0 degrees

## 2023-04-28 NOTE — REASON FOR VISIT
[FreeTextEntry2] : here today for f/u left UKA 4/20/23 pjsc.  patient is going to OPP[facility].  using oxycodone 1/day and tylenol.  no stockings and no assistive devices.

## 2023-05-16 ENCOUNTER — APPOINTMENT (OUTPATIENT)
Dept: ORTHOPEDIC SURGERY | Facility: CLINIC | Age: 48
End: 2023-05-16
Payer: COMMERCIAL

## 2023-05-16 VITALS — BODY MASS INDEX: 26.95 KG/M2 | WEIGHT: 210 LBS | HEIGHT: 74 IN

## 2023-05-16 PROCEDURE — 99024 POSTOP FOLLOW-UP VISIT: CPT

## 2023-05-16 PROCEDURE — 73562 X-RAY EXAM OF KNEE 3: CPT | Mod: LT

## 2023-05-16 NOTE — PHYSICAL EXAM
[5___] : hamstring 5[unfilled]/5 [] : patient ambulates without assistive device [Left] : left knee [AP] : anteroposterior [Lateral] : lateral [[place]] : skyline [FreeTextEntry9] : Well-aligned, well-fixed prosthesis. No lucency noted. Patella centered.  [TWNoteComboBox7] : flexion 130 degrees [de-identified] : extension 3 degrees

## 2023-05-16 NOTE — DISCUSSION/SUMMARY
[de-identified] : pt will continue PT at this time and remain out of work until 5/25/23. \par \par f/u 4 weeks\par \par

## 2023-05-16 NOTE — REASON FOR VISIT
[FreeTextEntry2] : left kne UKA 4/20/23 pjsc.  c/o still has pain and swelling.  going to PT 3x week, tylenol for pain and icing.

## 2023-05-22 ENCOUNTER — TRANSCRIPTION ENCOUNTER (OUTPATIENT)
Age: 48
End: 2023-05-22

## 2023-05-22 RX ORDER — MUPIROCIN 20 MG/G
2 OINTMENT TOPICAL
Qty: 1 | Refills: 0 | Status: DISCONTINUED | COMMUNITY
Start: 2023-04-14 | End: 2023-05-22

## 2023-05-22 RX ORDER — OXYCODONE 5 MG/1
5 TABLET ORAL
Qty: 42 | Refills: 0 | Status: DISCONTINUED | COMMUNITY
Start: 2023-04-20 | End: 2023-05-22

## 2023-05-22 RX ORDER — CEPHALEXIN 500 MG/1
500 CAPSULE ORAL 4 TIMES DAILY
Qty: 4 | Refills: 0 | Status: DISCONTINUED | COMMUNITY
Start: 2023-04-20 | End: 2023-05-22

## 2023-05-22 RX ORDER — NAPROXEN 500 MG/1
500 TABLET ORAL
Qty: 60 | Refills: 0 | Status: DISCONTINUED | COMMUNITY
Start: 2023-01-19 | End: 2023-05-22

## 2023-05-29 ENCOUNTER — TRANSCRIPTION ENCOUNTER (OUTPATIENT)
Age: 48
End: 2023-05-29

## 2023-06-22 ENCOUNTER — APPOINTMENT (OUTPATIENT)
Dept: ORTHOPEDIC SURGERY | Facility: CLINIC | Age: 48
End: 2023-06-22
Payer: COMMERCIAL

## 2023-06-22 DIAGNOSIS — M17.12 UNILATERAL PRIMARY OSTEOARTHRITIS, LEFT KNEE: ICD-10-CM

## 2023-06-22 PROCEDURE — 99024 POSTOP FOLLOW-UP VISIT: CPT

## 2023-06-22 NOTE — REASON FOR VISIT
[FreeTextEntry2] : left knee UKA 4/20/23 pjsc. .    Finished   PT 3x week, tylenol for pain and icing.    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

## 2023-06-22 NOTE — PHYSICAL EXAM
[5___] : hamstring 5[unfilled]/5 [Left] : left knee [AP] : anteroposterior [Lateral] : lateral [[place]] : skyline [] : non-antalgic [FreeTextEntry9] : Well-aligned, well-fixed prosthesis. No lucency noted. Patella centered.  [TWNoteComboBox7] : flexion 130 degrees [de-identified] : extension 0 degrees

## 2023-06-22 NOTE — DISCUSSION/SUMMARY
[de-identified] : Discussed importance of non-impact exercise and muscle stretching before and after exercise. Reviewed x-rays  Explained the importance of ice and rest.    Stretching exercises shown,  Explained the importance of Range of Motion before strength. \par \par Patient will resume PT at this time. \par Pain from prior to surgery is non existent.\par f/u 6 weeks

## 2023-06-27 ENCOUNTER — APPOINTMENT (OUTPATIENT)
Dept: FAMILY MEDICINE | Facility: CLINIC | Age: 48
End: 2023-06-27
Payer: COMMERCIAL

## 2023-06-27 VITALS — SYSTOLIC BLOOD PRESSURE: 122 MMHG | DIASTOLIC BLOOD PRESSURE: 70 MMHG

## 2023-06-27 VITALS
SYSTOLIC BLOOD PRESSURE: 118 MMHG | WEIGHT: 212 LBS | BODY MASS INDEX: 27.21 KG/M2 | DIASTOLIC BLOOD PRESSURE: 80 MMHG | TEMPERATURE: 98 F | RESPIRATION RATE: 16 BRPM | OXYGEN SATURATION: 98 % | HEIGHT: 74 IN | HEART RATE: 84 BPM

## 2023-06-27 DIAGNOSIS — Z12.11 ENCOUNTER FOR SCREENING FOR MALIGNANT NEOPLASM OF COLON: ICD-10-CM

## 2023-06-27 DIAGNOSIS — Z00.00 ENCOUNTER FOR GENERAL ADULT MEDICAL EXAMINATION W/OUT ABNORMAL FINDINGS: ICD-10-CM

## 2023-06-27 DIAGNOSIS — F51.01 PRIMARY INSOMNIA: ICD-10-CM

## 2023-06-27 DIAGNOSIS — S83.242A OTHER TEAR OF MEDIAL MENISCUS, CURRENT INJURY, LEFT KNEE, INITIAL ENCOUNTER: ICD-10-CM

## 2023-06-27 DIAGNOSIS — Z01.818 ENCOUNTER FOR OTHER PREPROCEDURAL EXAMINATION: ICD-10-CM

## 2023-06-27 DIAGNOSIS — M23.8X2 OTHER INTERNAL DERANGEMENTS OF LEFT KNEE: ICD-10-CM

## 2023-06-27 PROCEDURE — 36415 COLL VENOUS BLD VENIPUNCTURE: CPT

## 2023-06-27 PROCEDURE — 99213 OFFICE O/P EST LOW 20 MIN: CPT | Mod: 25

## 2023-06-27 PROCEDURE — 99396 PREV VISIT EST AGE 40-64: CPT | Mod: 25

## 2023-06-27 RX ORDER — ACETAMINOPHEN EXTRA STRENGTH 500 MG/1
500 TABLET ORAL EVERY 8 HOURS
Qty: 180 | Refills: 0 | Status: DISCONTINUED | COMMUNITY
Start: 2023-04-20 | End: 2023-06-27

## 2023-06-27 RX ORDER — ASPIRIN 325 MG/1
325 TABLET, COATED ORAL
Qty: 60 | Refills: 0 | Status: DISCONTINUED | COMMUNITY
Start: 2023-04-20 | End: 2023-06-27

## 2023-06-27 RX ORDER — FAMOTIDINE 20 MG/1
20 TABLET, FILM COATED ORAL
Qty: 60 | Refills: 0 | Status: DISCONTINUED | COMMUNITY
Start: 2023-04-20 | End: 2023-06-27

## 2023-06-27 RX ORDER — CELECOXIB 200 MG/1
200 CAPSULE ORAL TWICE DAILY
Qty: 28 | Refills: 0 | Status: DISCONTINUED | COMMUNITY
Start: 2023-04-20 | End: 2023-06-27

## 2023-06-28 NOTE — ADDENDUM
[FreeTextEntry1] : Medical record entries made by the scribe today, were at my direction and personally dictated to\par them by me, Dr. Allie Justin on 06/27/2023. I have reviewed the chart and\par agree that the record accurately reflects my personal performance of the history, physical exam,\par assessment and plan.\par \par \par I, Cristela Guardado acting as scribe for Dr. Allie Justin MD on 06/27/2023 at\par 1:05 PM.\par

## 2023-06-28 NOTE — ASSESSMENT
[FreeTextEntry1] : Annual exam for 47 year old with PMH as stated in HPI/active list.\par \par Management:\par \par See HPI and Plan\par \par Labs in office today. Will advise.\par \par Medications reconciled.\par \par Best wishes offered!\par

## 2023-06-28 NOTE — HEALTH RISK ASSESSMENT
[Yes] : Yes [2 - 4 times a month (2 pts)] : 2-4 times a month (2 points) [1 or 2 (0 pts)] : 1 or 2 (0 points) [Never (0 pts)] : Never (0 points) [No] : In the past 12 months have you used drugs other than those required for medical reasons? No [0] : 2) Feeling down, depressed, or hopeless: Not at all (0) [PHQ-2 Negative - No further assessment needed] : PHQ-2 Negative - No further assessment needed [Very Good] : ~his/her~  mood as very good [Never] : Never [FreeTextEntry1] : KNEE  [de-identified] : ORTHO  [Audit-CScore] : 2 [de-identified] : "Everything"  Really active  [de-identified] : Mostly HOme cooking"  [[id number]] : 0

## 2023-06-28 NOTE — REVIEW OF SYSTEMS
[Anxiety] : anxiety [Joint Pain] : joint pain [Fever] : no fever [Fatigue] : no fatigue [Discharge] : no discharge [Itching] : no itching [Earache] : no earache [Hearing Loss] : no hearing loss [Chest Pain] : no chest pain [Palpitations] : no palpitations [Shortness Of Breath] : no shortness of breath [Abdominal Pain] : no abdominal pain [Dysuria] : no dysuria [Hesitancy] : no hesitancy [Nocturia] : no nocturia [Itching] : no itching [Headache] : no headache [FreeTextEntry9] : see HPI.

## 2023-06-28 NOTE — PHYSICAL EXAM
[No Acute Distress] : no acute distress [Well Developed] : well developed [Normal Sclera/Conjunctiva] : normal sclera/conjunctiva [Normal Outer Ear/Nose] : the outer ears and nose were normal in appearance [Supple] : supple [No Respiratory Distress] : no respiratory distress  [Clear to Auscultation] : lungs were clear to auscultation bilaterally [Normal Rate] : normal rate  [Thyroid Normal, No Nodules] : the thyroid was normal and there were no nodules present [Soft] : abdomen soft [No CVA Tenderness] : no CVA  tenderness [No Joint Swelling] : no joint swelling [No Rash] : no rash [Coordination Grossly Intact] : coordination grossly intact [Speech Grossly Normal] : speech grossly normal [Alert and Oriented x3] : oriented to person, place, and time [PERRL] : pupils equal round and reactive to light [EOMI] : extraocular movements intact [Normal Oropharynx] : the oropharynx was normal [Normal TMs] : both tympanic membranes were normal [Regular Rhythm] : with a regular rhythm [No Edema] : there was no peripheral edema [Non Tender] : non-tender [No HSM] : no HSM [Normal Supraclavicular Nodes] : no supraclavicular lymphadenopathy [No Spinal Tenderness] : no spinal tenderness [No Focal Deficits] : no focal deficits [Deep Tendon Reflexes (DTR)] : deep tendon reflexes were 2+ and symmetric [de-identified] : calm and engaging [de-identified] : LUISITO

## 2023-06-28 NOTE — HISTORY OF PRESENT ILLNESS
[FreeTextEntry1] : CPE \par no complaints feels in good health \par [facility] [de-identified] : SKYLAR STEWART is a 47 year old M who presents today for comprehensive physical exam.\par \par The patient explained that his recovery from status post left UKA that was completed on 4/20/23 by Dr. Fernando has been harder than he thought. Specifically, he endorsed soreness and pain in his left knee.\par \par Furthermore, he displayed that he has been working on having better mental and physical habits to overcome his anxiety.\par \par FMHx: brother: colon cancer\par \par He stated that he goes to the gym 5-6 times per week.\par \par He is going to [facility] in [place] next week.  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 \par \par Requests a refill of Mirtazapine for PRN use for insomnia.

## 2023-07-10 LAB
ALBUMIN SERPL ELPH-MCNC: 4.7 G/DL
ALP BLD-CCNC: 94 U/L
ALT SERPL-CCNC: 35 U/L
ANION GAP SERPL CALC-SCNC: 16 MMOL/L
AST SERPL-CCNC: 25 U/L
BILIRUB SERPL-MCNC: 0.6 MG/DL
BUN SERPL-MCNC: 19 MG/DL
CALCIUM SERPL-MCNC: 10.2 MG/DL
CHLORIDE SERPL-SCNC: 103 MMOL/L
CHOLEST SERPL-MCNC: 191 MG/DL
CO2 SERPL-SCNC: 22 MMOL/L
CREAT SERPL-MCNC: 1.13 MG/DL
EGFR: 81 ML/MIN/1.73M2
ESTIMATED AVERAGE GLUCOSE: 111 MG/DL
GLUCOSE SERPL-MCNC: 90 MG/DL
HBA1C MFR BLD HPLC: 5.5 %
HDLC SERPL-MCNC: 70 MG/DL
LDLC SERPL CALC-MCNC: 95 MG/DL
NONHDLC SERPL-MCNC: 121 MG/DL
POTASSIUM SERPL-SCNC: 4.5 MMOL/L
PROT SERPL-MCNC: 7.6 G/DL
SODIUM SERPL-SCNC: 141 MMOL/L
TRIGL SERPL-MCNC: 132 MG/DL
TSH SERPL-ACNC: 1.73 UIU/ML

## 2023-07-17 ENCOUNTER — TRANSCRIPTION ENCOUNTER (OUTPATIENT)
Age: 48
End: 2023-07-17

## 2023-08-01 ENCOUNTER — APPOINTMENT (OUTPATIENT)
Dept: ORTHOPEDIC SURGERY | Facility: CLINIC | Age: 48
End: 2023-08-01
Payer: COMMERCIAL

## 2023-08-01 PROCEDURE — 73562 X-RAY EXAM OF KNEE 3: CPT | Mod: LT

## 2023-08-01 PROCEDURE — 99213 OFFICE O/P EST LOW 20 MIN: CPT

## 2023-09-13 ENCOUNTER — NON-APPOINTMENT (OUTPATIENT)
Age: 48
End: 2023-09-13

## 2023-11-02 ENCOUNTER — TRANSCRIPTION ENCOUNTER (OUTPATIENT)
Age: 48
End: 2023-11-02

## 2023-11-02 RX ORDER — MIRTAZAPINE 15 MG/1
15 TABLET, FILM COATED ORAL
Qty: 30 | Refills: 0 | Status: ACTIVE | COMMUNITY
Start: 2023-06-28 | End: 1900-01-01

## 2023-11-10 ENCOUNTER — TRANSCRIPTION ENCOUNTER (OUTPATIENT)
Age: 48
End: 2023-11-10

## 2024-04-16 ENCOUNTER — TRANSCRIPTION ENCOUNTER (OUTPATIENT)
Age: 49
End: 2024-04-16

## 2024-04-16 RX ORDER — MIRTAZAPINE 15 MG/1
15 TABLET, FILM COATED ORAL
Qty: 60 | Refills: 0 | Status: ACTIVE | COMMUNITY
Start: 2020-10-20 | End: 1900-01-01

## 2024-04-22 ENCOUNTER — TRANSCRIPTION ENCOUNTER (OUTPATIENT)
Age: 49
End: 2024-04-22